# Patient Record
Sex: MALE | Race: WHITE | URBAN - METROPOLITAN AREA
[De-identification: names, ages, dates, MRNs, and addresses within clinical notes are randomized per-mention and may not be internally consistent; named-entity substitution may affect disease eponyms.]

---

## 2022-01-17 ENCOUNTER — WEB ENCOUNTER (OUTPATIENT)
Dept: URBAN - METROPOLITAN AREA CLINIC 13 | Facility: CLINIC | Age: 77
End: 2022-01-17

## 2022-01-18 ENCOUNTER — OFFICE VISIT (OUTPATIENT)
Dept: URBAN - METROPOLITAN AREA CLINIC 113 | Facility: CLINIC | Age: 77
End: 2022-01-18
Payer: COMMERCIAL

## 2022-01-18 ENCOUNTER — WEB ENCOUNTER (OUTPATIENT)
Dept: URBAN - METROPOLITAN AREA CLINIC 113 | Facility: CLINIC | Age: 77
End: 2022-01-18

## 2022-01-18 VITALS
DIASTOLIC BLOOD PRESSURE: 72 MMHG | BODY MASS INDEX: 27.11 KG/M2 | WEIGHT: 153 LBS | HEART RATE: 65 BPM | HEIGHT: 63 IN | SYSTOLIC BLOOD PRESSURE: 126 MMHG | TEMPERATURE: 97.5 F

## 2022-01-18 DIAGNOSIS — K76.6 PORTAL HYPERTENSION: ICD-10-CM

## 2022-01-18 DIAGNOSIS — K70.31 ASCITES DUE TO ALCOHOLIC CIRRHOSIS: ICD-10-CM

## 2022-01-18 DIAGNOSIS — R60.0 LOWER EXTREMITY EDEMA: ICD-10-CM

## 2022-01-18 DIAGNOSIS — D69.6 THROMBOCYTOPENIA: ICD-10-CM

## 2022-01-18 DIAGNOSIS — K74.60 CIRRHOSIS: ICD-10-CM

## 2022-01-18 DIAGNOSIS — K86.2 PANCREATIC CYST: ICD-10-CM

## 2022-01-18 DIAGNOSIS — I85.00 ESOPHAGEAL VARICES: ICD-10-CM

## 2022-01-18 PROCEDURE — 99205 OFFICE O/P NEW HI 60 MIN: CPT | Performed by: NURSE PRACTITIONER

## 2022-01-18 RX ORDER — CYCLOBENZAPRINE HYDROCHLORIDE 10 MG/1
1 TABLET AT BEDTIME AS NEEDED TABLET, FILM COATED ORAL ONCE A DAY
Status: ACTIVE | COMMUNITY

## 2022-01-18 RX ORDER — METOPROLOL TARTRATE 25 MG/1
1 TABLET WITH FOOD TABLET, FILM COATED ORAL TWICE A DAY
Status: ACTIVE | COMMUNITY

## 2022-01-18 RX ORDER — MULTIVITAMIN
1 TABLET TABLET ORAL ONCE A DAY
Status: ACTIVE | COMMUNITY

## 2022-01-18 RX ORDER — ZINC GLUCONATE 50 MG
1 TABLET TABLET ORAL ONCE A DAY
Status: ACTIVE | COMMUNITY

## 2022-01-18 RX ORDER — OMEPRAZOLE 20 MG/1
1 CAPSULE 30 MINUTES BEFORE MORNING MEAL CAPSULE, DELAYED RELEASE ORAL ONCE A DAY
Status: ACTIVE | COMMUNITY

## 2022-01-18 RX ORDER — LISINOPRIL 10 MG/1
1 TABLET TABLET ORAL ONCE A DAY
Status: ACTIVE | COMMUNITY

## 2022-01-18 RX ORDER — FUROSEMIDE 40 MG/1
1 TABLET TABLET ORAL ONCE A DAY
Qty: 30 TABLET | Refills: 2 | OUTPATIENT
Start: 2022-01-18

## 2022-01-18 RX ORDER — METFORMIN HYDROCHLORIDE 500 MG/1
1 TABLET WITH A MEAL TABLET, FILM COATED ORAL ONCE A DAY
Status: ACTIVE | COMMUNITY

## 2022-01-18 RX ORDER — SPIRONOLACTONE 100 MG/1
1 TABLET TABLET, FILM COATED ORAL ONCE A DAY
Qty: 30 TABLET | Refills: 2 | OUTPATIENT
Start: 2022-01-18 | End: 2022-04-18

## 2022-01-18 RX ORDER — TRAZODONE HYDROCHLORIDE 100 MG/1
1 TABLET AT BEDTIME TABLET, FILM COATED ORAL ONCE A DAY
Status: ACTIVE | COMMUNITY

## 2022-01-18 RX ORDER — ASCORBIC ACID 1000 MG
1 TABLET TABLET ORAL ONCE A DAY
Status: ACTIVE | COMMUNITY

## 2022-01-18 RX ORDER — SPIRONOLACTONE 50 MG/1
1 TABLET TABLET, FILM COATED ORAL ONCE A DAY
Status: ACTIVE | COMMUNITY

## 2022-01-18 RX ORDER — FUROSEMIDE 20 MG/1
1 TABLET TABLET ORAL ONCE A DAY
Status: ACTIVE | COMMUNITY

## 2022-01-18 RX ORDER — GLIMEPIRIDE 4 MG/1
1 TABLET WITH BREAKFAST OR THE FIRST MAIN MEAL OF THE DAY TABLET ORAL TWICE DAILY
Status: ACTIVE | COMMUNITY

## 2022-01-18 NOTE — HPI-TODAY'S VISIT:
76-year-old male with a history of hypertension, diabetes, hemochromatosis, presenting for evaluation of cirrhosis.  This is a second opinion.  He is accompanied by both his wife and daughter. He was diagnosed with cirrhosis several years ago, which was automatically attributed to his alcohol use.  Per the patient and his family, he never underwent further testing to exclude autoimmune, viral, or hereditary cause of liver disease.  He continued to drink alcohol despite this.  In October 2021, he experienced the sudden onset of abdominal pain, nausea, vomiting, diarrhea, and fluid retention, prompting follow-up with GI.  He was started on furosemide 20 mg daily and spironolactone 50 mg daily, and recommended routine office follow-up every 3 months.  This has been frustrating to the patient and family.  They do not feel that they have received a lot of answers and are confused regarding his diagnosis.  He continues to experience ongoing difficulty with swelling of his abdomen and legs, ultimately requiring paracentesis last week.  Recent labs and imaging findings are outlined below. He denies abdominal pain, nausea or vomiting.  He has 2 nonbloody stools per day.  No jaundice or icterus.  He complains of abdominal, scrotal, and leg swelling despite his current dosing of furosemide and spironolactone.  He reports that history of hemochromatosis, previously requiring phlebotomy.  This was managed by hematology, Dr. Warren.  He also reports a history of esophageal varices, previously requiring banding 2-3 times.  He cannot recall when his last upper endoscopy was performed.  He reports a normal colonoscopy 3 years ago.  There is no family history of GI malignancy. MRI of the abdomen with and without contrast/MRCP 1/14/2022:Cirrhosis with portal hypertension and large volume ascites, 4 mm sidebranch intraductal papillary mucinous neoplasm in the pancreatic tail. Ultrasound-guided paracentesis 1/10/2022:4.4 L removed. Labs 1/10/2022:H/H13.9/40.9, MCV 97.8, .  AST 49, ALT 29, , T bili 2.0, CMP otherwise unremarkable with BUN/creat 14/0.6.  PT/INR 15.9/1.3.  Current MELD score 13.

## 2022-01-25 ENCOUNTER — LAB OUTSIDE AN ENCOUNTER (OUTPATIENT)
Dept: URBAN - METROPOLITAN AREA CLINIC 113 | Facility: CLINIC | Age: 77
End: 2022-01-25

## 2022-02-03 ENCOUNTER — TELEPHONE ENCOUNTER (OUTPATIENT)
Dept: URBAN - METROPOLITAN AREA CLINIC 113 | Facility: CLINIC | Age: 77
End: 2022-02-03

## 2022-02-08 ENCOUNTER — TELEPHONE ENCOUNTER (OUTPATIENT)
Dept: URBAN - METROPOLITAN AREA CLINIC 6 | Facility: CLINIC | Age: 77
End: 2022-02-08

## 2022-02-10 ENCOUNTER — TELEPHONE ENCOUNTER (OUTPATIENT)
Dept: URBAN - METROPOLITAN AREA CLINIC 113 | Facility: CLINIC | Age: 77
End: 2022-02-10

## 2022-02-24 ENCOUNTER — OFFICE VISIT (OUTPATIENT)
Dept: URBAN - METROPOLITAN AREA CLINIC 113 | Facility: CLINIC | Age: 77
End: 2022-02-24
Payer: COMMERCIAL

## 2022-02-24 VITALS
TEMPERATURE: 97.7 F | HEART RATE: 63 BPM | SYSTOLIC BLOOD PRESSURE: 98 MMHG | BODY MASS INDEX: 21.97 KG/M2 | DIASTOLIC BLOOD PRESSURE: 64 MMHG | HEIGHT: 63 IN | WEIGHT: 124 LBS

## 2022-02-24 DIAGNOSIS — K76.6 PORTAL HYPERTENSION: ICD-10-CM

## 2022-02-24 DIAGNOSIS — K74.60 CIRRHOSIS: ICD-10-CM

## 2022-02-24 DIAGNOSIS — D69.6 THROMBOCYTOPENIA: ICD-10-CM

## 2022-02-24 DIAGNOSIS — K86.2 PANCREATIC CYST: ICD-10-CM

## 2022-02-24 DIAGNOSIS — I85.00 ESOPHAGEAL VARICES: ICD-10-CM

## 2022-02-24 DIAGNOSIS — E83.10 CIRRHOSIS DUE TO HEMOCHROMATOSIS: ICD-10-CM

## 2022-02-24 DIAGNOSIS — R18.8 OTHER ASCITES: ICD-10-CM

## 2022-02-24 PROBLEM — 1082601000119104: Status: ACTIVE | Noted: 2022-01-18

## 2022-02-24 PROCEDURE — 99214 OFFICE O/P EST MOD 30 MIN: CPT | Performed by: INTERNAL MEDICINE

## 2022-02-24 RX ORDER — ASCORBIC ACID 1000 MG
1 TABLET TABLET ORAL ONCE A DAY
Status: ACTIVE | COMMUNITY

## 2022-02-24 RX ORDER — METFORMIN HYDROCHLORIDE 500 MG/1
1 TABLET WITH A MEAL TABLET, FILM COATED ORAL ONCE A DAY
Status: ACTIVE | COMMUNITY

## 2022-02-24 RX ORDER — CYCLOBENZAPRINE HYDROCHLORIDE 10 MG/1
1 TABLET AT BEDTIME AS NEEDED TABLET, FILM COATED ORAL ONCE A DAY
Status: ACTIVE | COMMUNITY

## 2022-02-24 RX ORDER — SPIRONOLACTONE 100 MG/1
1 TABLET TABLET, FILM COATED ORAL ONCE A DAY
Qty: 30 TABLET | Refills: 2 | Status: ACTIVE | COMMUNITY
Start: 2022-01-18 | End: 2022-04-18

## 2022-02-24 RX ORDER — OMEPRAZOLE 20 MG/1
1 CAPSULE 30 MINUTES BEFORE MORNING MEAL CAPSULE, DELAYED RELEASE ORAL ONCE A DAY
Status: ACTIVE | COMMUNITY

## 2022-02-24 RX ORDER — LISINOPRIL 10 MG/1
1 TABLET TABLET ORAL ONCE A DAY
Status: ACTIVE | COMMUNITY

## 2022-02-24 RX ORDER — METOPROLOL TARTRATE 25 MG/1
1 TABLET WITH FOOD TABLET, FILM COATED ORAL TWICE A DAY
Status: ACTIVE | COMMUNITY

## 2022-02-24 RX ORDER — ZINC GLUCONATE 50 MG
1 TABLET TABLET ORAL ONCE A DAY
Status: ACTIVE | COMMUNITY

## 2022-02-24 RX ORDER — FUROSEMIDE 40 MG/1
1 TABLET TABLET ORAL ONCE A DAY
Qty: 30 TABLET | Refills: 2 | Status: ACTIVE | COMMUNITY
Start: 2022-01-18

## 2022-02-24 RX ORDER — MULTIVITAMIN
1 TABLET TABLET ORAL ONCE A DAY
Status: ACTIVE | COMMUNITY

## 2022-02-24 RX ORDER — GLIMEPIRIDE 4 MG/1
1 TABLET WITH BREAKFAST OR THE FIRST MAIN MEAL OF THE DAY TABLET ORAL TWICE DAILY
Status: ACTIVE | COMMUNITY

## 2022-02-24 RX ORDER — TRAZODONE HYDROCHLORIDE 100 MG/1
1 TABLET AT BEDTIME TABLET, FILM COATED ORAL ONCE A DAY
Status: ACTIVE | COMMUNITY

## 2022-02-24 NOTE — HPI-OTHER HISTORIES
On 1/26/2022 hepatitis a total antibody is positive, hepatitis B surface antigen negative, hepatitis B surface antibody negative, hepatitis B core antibody negative, hepatitis C antibody negative. Alpha-1 antitrypsin level is normal at 169.  Ferritin is 234.  Antinuclear antibody is negative, antimitochondrial antibody is negative, anti-smooth muscle antibody is negative. IgG level is elevated at 2092, IgA level is elevated at 533, IgM level is normal at 182.  Alpha-fetoprotein is 3.1.

## 2022-02-24 NOTE — HPI-TODAY'S VISIT:
76-year-old male with a history of hypertension, diabetes, hemochromatosis, presenting for evaluation of cirrhosis.  This is a second opinion.  He is accompanied by both his wife and daughter. He was diagnosed with cirrhosis several years ago, which was automatically attributed to his alcohol use.  Per the patient and his family, he never underwent further testing to exclude autoimmune, viral, or hereditary cause of liver disease.  He continued to drink alcohol despite this.  In October 2021, he experienced the sudden onset of abdominal pain, nausea, vomiting, diarrhea, and fluid retention, prompting follow-up with GI.  He was started on furosemide 20 mg daily and spironolactone 50 mg daily, and recommended routine office follow-up every 3 months.  This has been frustrating to the patient and family.  They do not feel that they have received a lot of answers and are confused regarding his diagnosis.  He continues to experience ongoing difficulty with swelling of his abdomen and legs, ultimately requiring paracentesis last week.  Recent labs and imaging findings are outlined below. He denies abdominal pain, nausea or vomiting.  He has 2 nonbloody stools per day.  No jaundice or icterus.  He complains of abdominal, scrotal, and leg swelling despite his current dosing of furosemide and spironolactone.  He reports that history of hemochromatosis, previously requiring phlebotomy.  This was managed by hematology, Dr. Warren.  There is no family history of GI malignancy. MRI of the abdomen with and without contrast/MRCP 1/14/2022:Cirrhosis with portal hypertension and large volume ascites, 4 mm sidebranch intraductal papillary mucinous neoplasm in the pancreatic tail. Ultrasound-guided paracentesis 1/10/2022:4.4 L removed. He is doing very well.  Denies any peripheral edema or abdominal distention.  He occasionally has heartburn but no dysphagia or abdominal pain.  There is no nausea or vomiting.  He has 2 bowel movements per day without any blood or melena. Labs 1/10/2022:H/H13.9/40.9, MCV 97.8, .  AST 49, ALT 29, , T bili 2.0, CMP otherwise unremarkable with BUN/creat 14/0.6.  PT/INR 15.9/1.3. Labs on 2/14/2022 revealed sodium 131 potassium 4.3 BUN 23 creatinine 0.7.  , ALT 93, alk phosphatase 216, total bili 1.3, albumin 3.3.

## 2022-02-28 ENCOUNTER — TELEPHONE ENCOUNTER (OUTPATIENT)
Dept: URBAN - METROPOLITAN AREA CLINIC 113 | Facility: CLINIC | Age: 77
End: 2022-02-28

## 2022-03-07 ENCOUNTER — LAB OUTSIDE AN ENCOUNTER (OUTPATIENT)
Dept: URBAN - METROPOLITAN AREA CLINIC 113 | Facility: CLINIC | Age: 77
End: 2022-03-07

## 2022-03-16 ENCOUNTER — TELEPHONE ENCOUNTER (OUTPATIENT)
Dept: URBAN - METROPOLITAN AREA CLINIC 113 | Facility: CLINIC | Age: 77
End: 2022-03-16

## 2022-03-21 ENCOUNTER — LAB OUTSIDE AN ENCOUNTER (OUTPATIENT)
Dept: URBAN - METROPOLITAN AREA CLINIC 113 | Facility: CLINIC | Age: 77
End: 2022-03-21

## 2022-03-24 ENCOUNTER — TELEPHONE ENCOUNTER (OUTPATIENT)
Dept: URBAN - METROPOLITAN AREA CLINIC 113 | Facility: CLINIC | Age: 77
End: 2022-03-24

## 2022-04-06 ENCOUNTER — TELEPHONE ENCOUNTER (OUTPATIENT)
Dept: URBAN - METROPOLITAN AREA CLINIC 113 | Facility: CLINIC | Age: 77
End: 2022-04-06

## 2022-04-13 ENCOUNTER — OFFICE VISIT (OUTPATIENT)
Dept: URBAN - METROPOLITAN AREA CLINIC 113 | Facility: CLINIC | Age: 77
End: 2022-04-13
Payer: COMMERCIAL

## 2022-04-13 VITALS
DIASTOLIC BLOOD PRESSURE: 64 MMHG | TEMPERATURE: 98.2 F | HEIGHT: 63 IN | SYSTOLIC BLOOD PRESSURE: 106 MMHG | BODY MASS INDEX: 22.86 KG/M2 | HEART RATE: 64 BPM | WEIGHT: 129 LBS

## 2022-04-13 DIAGNOSIS — E83.10 CIRRHOSIS DUE TO HEMOCHROMATOSIS: ICD-10-CM

## 2022-04-13 DIAGNOSIS — I85.00 ESOPHAGEAL VARICES: ICD-10-CM

## 2022-04-13 DIAGNOSIS — K74.60 CIRRHOSIS: ICD-10-CM

## 2022-04-13 DIAGNOSIS — R18.8 OTHER ASCITES: ICD-10-CM

## 2022-04-13 DIAGNOSIS — R05.9 COUGH: ICD-10-CM

## 2022-04-13 DIAGNOSIS — K86.2 PANCREATIC CYST: ICD-10-CM

## 2022-04-13 DIAGNOSIS — D69.6 THROMBOCYTOPENIA: ICD-10-CM

## 2022-04-13 DIAGNOSIS — K76.6 PORTAL HYPERTENSION: ICD-10-CM

## 2022-04-13 DIAGNOSIS — K40.90 RIGHT INGUINAL HERNIA: ICD-10-CM

## 2022-04-13 PROCEDURE — 99214 OFFICE O/P EST MOD 30 MIN: CPT | Performed by: INTERNAL MEDICINE

## 2022-04-13 RX ORDER — ZINC GLUCONATE 50 MG
1 TABLET TABLET ORAL ONCE A DAY
Status: ACTIVE | COMMUNITY

## 2022-04-13 RX ORDER — GLIMEPIRIDE 4 MG/1
1 TABLET WITH BREAKFAST OR THE FIRST MAIN MEAL OF THE DAY TABLET ORAL TWICE DAILY
Status: ACTIVE | COMMUNITY

## 2022-04-13 RX ORDER — FUROSEMIDE 40 MG/1
1 TABLET TABLET ORAL ONCE A DAY
Qty: 30 TABLET | Refills: 2 | Status: ACTIVE | COMMUNITY
Start: 2022-01-18

## 2022-04-13 RX ORDER — MULTIVITAMIN
1 TABLET TABLET ORAL ONCE A DAY
Status: ACTIVE | COMMUNITY

## 2022-04-13 RX ORDER — INSULIN LISPRO 100 [IU]/ML
AS DIRECTED INJECTION, SOLUTION INTRAVENOUS; SUBCUTANEOUS
Status: ACTIVE | COMMUNITY

## 2022-04-13 RX ORDER — INSULIN DEGLUDEC INJECTION 100 U/ML
AS DIRECTED INJECTION, SOLUTION SUBCUTANEOUS
Status: ACTIVE | COMMUNITY

## 2022-04-13 RX ORDER — SPIRONOLACTONE 100 MG/1
1 TABLET TABLET, FILM COATED ORAL ONCE A DAY
Qty: 30 TABLET | Refills: 2 | Status: ACTIVE | COMMUNITY
Start: 2022-01-18 | End: 2022-04-18

## 2022-04-13 RX ORDER — ASCORBIC ACID 1000 MG
1 TABLET TABLET ORAL ONCE A DAY
Status: ACTIVE | COMMUNITY

## 2022-04-13 RX ORDER — TRAZODONE HYDROCHLORIDE 100 MG/1
1 TABLET AT BEDTIME TABLET, FILM COATED ORAL ONCE A DAY
Status: ACTIVE | COMMUNITY

## 2022-04-13 RX ORDER — LISINOPRIL 10 MG/1
1 TABLET TABLET ORAL ONCE A DAY
Status: ON HOLD | COMMUNITY

## 2022-04-13 RX ORDER — OMEPRAZOLE 20 MG/1
1 CAPSULE 30 MINUTES BEFORE MORNING MEAL CAPSULE, DELAYED RELEASE ORAL ONCE A DAY
Status: ACTIVE | COMMUNITY

## 2022-04-13 RX ORDER — CYCLOBENZAPRINE HYDROCHLORIDE 10 MG/1
1 TABLET AT BEDTIME AS NEEDED TABLET, FILM COATED ORAL ONCE A DAY
Status: ACTIVE | COMMUNITY

## 2022-04-13 RX ORDER — METFORMIN HYDROCHLORIDE 500 MG/1
1 TABLET WITH A MEAL TABLET, FILM COATED ORAL ONCE A DAY
Status: ON HOLD | COMMUNITY

## 2022-04-13 RX ORDER — METOPROLOL TARTRATE 25 MG/1
1 TABLET WITH FOOD TABLET, FILM COATED ORAL TWICE A DAY
Status: ACTIVE | COMMUNITY

## 2022-04-13 NOTE — PHYSICAL EXAM GASTROINTESTINAL
Abdomen , soft, nontender, Distended but soft ascites is present., no guarding or rigidity , no masses palpable , normal bowel sounds , Liver and Spleen , no hepatomegaly present , no hepatosplenomegaly , liver nontender , spleen not palpable

## 2022-04-13 NOTE — HPI-TODAY'S VISIT:
76-year-old male with a history of hypertension, diabetes, hemochromatosis, presenting for evaluation of cirrhosis.  This is a second opinion.  He is accompanied by both his wife and daughter. He was diagnosed with cirrhosis several years ago, which was automatically attributed to his alcohol use.  Per the patient and his family, he never underwent further testing to exclude autoimmune, viral, or hereditary cause of liver disease.  He continued to drink alcohol despite this.  In October 2021, he experienced the sudden onset of abdominal pain, nausea, vomiting, diarrhea, and fluid retention, prompting follow-up with GI.  He was started on furosemide 20 mg daily and spironolactone 50 mg daily, and recommended routine office follow-up every 3 months.  This has been frustrating to the patient and family.  They do not feel that they have received a lot of answers and are confused regarding his diagnosis.  He continues to experience ongoing difficulty with swelling of his abdomen and legs, ultimately requiring paracentesis last week.  Recent labs and imaging findings are outlined below. He denies abdominal pain, nausea or vomiting.  He has 2 nonbloody stools per day.  No jaundice or icterus.  He complains of abdominal, scrotal, and leg swelling despite his current dosing of furosemide and spironolactone.  He reports that history of hemochromatosis, previously requiring phlebotomy.  This was managed by hematology, Dr. Warren.  There is no family history of GI malignancy. MRI of the abdomen with and without contrast/MRCP 1/14/2022:Cirrhosis with portal hypertension and large volume ascites, 4 mm sidebranch intraductal papillary mucinous neoplasm in the pancreatic tail. Ultrasound-guided paracentesis 1/10/2022:4.4 L removed.  Labs 1/10/2022:H/H13.9/40.9, MCV 97.8, .  AST 49, ALT 29, , T bili 2.0, CMP otherwise unremarkable with BUN/creat 14/0.6.  PT/INR 15.9/1.3. Labs on 2/14/2022 revealed sodium 131 potassium 4.3 BUN 23 creatinine 0.7.  , ALT 93, alk phosphatase 216, total bili 1.3, albumin 3.3. Patient had been having some coughing.  This was clear material.  He saw his PCP and they did a chest x-ray but we do not have that result.  Lisinopril was discontinued.  The cough is better currently.  He denies heartburn as long as he takes his proton pump inhibitor.  Both solids and liquids are little slow to go down but no obstructive symptoms.  He has noticed a lump in his right groin area.  Occasionally can be tender.  He has bowel movements about twice a day and has been no blood or melena.  There's been no nausea or vomiting.  There is no fevers or chills.  Abdominal distention has remained stable.  He brings broad work with him that was done on 4/4/22.  This revealed a sodium 128 potassium 4.6 BUN 29 creatinine 0.7 because of 280.  Albumin is 2.8.  Total bili is 1.5, AST 97, ALT 76, alkaline phosphatase is 2:30.  Hemoglobin is 13.5, WBC is 7.7 and platelet count is 165,000.  PT/INR is 1.4.

## 2022-04-25 ENCOUNTER — TELEPHONE ENCOUNTER (OUTPATIENT)
Dept: URBAN - METROPOLITAN AREA CLINIC 107 | Facility: CLINIC | Age: 77
End: 2022-04-25

## 2022-04-25 RX ORDER — SPIRONOLACTONE 100 MG/1
1 TABLET TABLET, FILM COATED ORAL ONCE A DAY
Qty: 30 | Refills: 3 | OUTPATIENT
Start: 2022-04-25 | End: 2022-08-23

## 2022-05-02 ENCOUNTER — LAB OUTSIDE AN ENCOUNTER (OUTPATIENT)
Dept: URBAN - METROPOLITAN AREA CLINIC 113 | Facility: CLINIC | Age: 77
End: 2022-05-02

## 2022-05-04 ENCOUNTER — OFFICE VISIT (OUTPATIENT)
Dept: URBAN - METROPOLITAN AREA CLINIC 113 | Facility: CLINIC | Age: 77
End: 2022-05-04

## 2022-06-08 ENCOUNTER — TELEPHONE ENCOUNTER (OUTPATIENT)
Dept: URBAN - METROPOLITAN AREA CLINIC 23 | Facility: CLINIC | Age: 77
End: 2022-06-08

## 2022-06-08 RX ORDER — FUROSEMIDE 40 MG/1
1 TABLET TABLET ORAL ONCE A DAY
Qty: 30 TABLET | Refills: 6
Start: 2022-01-18

## 2022-06-10 ENCOUNTER — OFFICE VISIT (OUTPATIENT)
Dept: URBAN - METROPOLITAN AREA CLINIC 113 | Facility: CLINIC | Age: 77
End: 2022-06-10

## 2022-06-14 ENCOUNTER — TELEPHONE ENCOUNTER (OUTPATIENT)
Dept: URBAN - METROPOLITAN AREA CLINIC 113 | Facility: CLINIC | Age: 77
End: 2022-06-14

## 2022-06-14 RX ORDER — FUROSEMIDE 40 MG/1
1 TABLET TABLET ORAL ONCE A DAY
Qty: 30 TABLET | Refills: 6
Start: 2022-01-18

## 2022-07-06 ENCOUNTER — OFFICE VISIT (OUTPATIENT)
Dept: URBAN - METROPOLITAN AREA CLINIC 113 | Facility: CLINIC | Age: 77
End: 2022-07-06

## 2022-09-02 ENCOUNTER — TELEPHONE ENCOUNTER (OUTPATIENT)
Dept: URBAN - METROPOLITAN AREA CLINIC 113 | Facility: CLINIC | Age: 77
End: 2022-09-02

## 2022-09-02 RX ORDER — SPIRONOLACTONE 100 MG/1
1 TABLET TABLET, FILM COATED ORAL ONCE A DAY
Qty: 90 | Refills: 0
Start: 2022-04-25 | End: 2022-12-01

## 2022-09-05 ENCOUNTER — ERX REFILL RESPONSE (OUTPATIENT)
Dept: URBAN - METROPOLITAN AREA CLINIC 107 | Facility: CLINIC | Age: 77
End: 2022-09-05

## 2022-09-05 RX ORDER — SPIRONOLACTONE 100 MG/1
1 TABLET TABLET, FILM COATED ORAL ONCE A DAY
Qty: 90 | Refills: 0 | OUTPATIENT

## 2022-10-11 ENCOUNTER — OFFICE VISIT (OUTPATIENT)
Dept: URBAN - METROPOLITAN AREA CLINIC 113 | Facility: CLINIC | Age: 77
End: 2022-10-11

## 2022-11-15 ENCOUNTER — OFFICE VISIT (OUTPATIENT)
Dept: URBAN - METROPOLITAN AREA CLINIC 113 | Facility: CLINIC | Age: 77
End: 2022-11-15
Payer: COMMERCIAL

## 2022-11-15 VITALS
HEART RATE: 59 BPM | RESPIRATION RATE: 16 BRPM | BODY MASS INDEX: 27.46 KG/M2 | DIASTOLIC BLOOD PRESSURE: 60 MMHG | WEIGHT: 155 LBS | SYSTOLIC BLOOD PRESSURE: 113 MMHG | TEMPERATURE: 97.8 F | HEIGHT: 63 IN

## 2022-11-15 DIAGNOSIS — K76.6 PORTAL HYPERTENSION: ICD-10-CM

## 2022-11-15 DIAGNOSIS — K86.2 PANCREATIC CYST: ICD-10-CM

## 2022-11-15 DIAGNOSIS — D69.6 THROMBOCYTOPENIA: ICD-10-CM

## 2022-11-15 DIAGNOSIS — R05.9 COUGH: ICD-10-CM

## 2022-11-15 DIAGNOSIS — K40.90 RIGHT INGUINAL HERNIA: ICD-10-CM

## 2022-11-15 DIAGNOSIS — R18.8 OTHER ASCITES: ICD-10-CM

## 2022-11-15 DIAGNOSIS — I85.00 ESOPHAGEAL VARICES: ICD-10-CM

## 2022-11-15 DIAGNOSIS — E83.10 CIRRHOSIS DUE TO HEMOCHROMATOSIS: ICD-10-CM

## 2022-11-15 DIAGNOSIS — K74.60 CIRRHOSIS: ICD-10-CM

## 2022-11-15 PROBLEM — 28670008 ESOPHAGEAL VARICES: Status: ACTIVE | Noted: 2022-01-18

## 2022-11-15 PROBLEM — 31258000 PANCREATIC CYST: Status: ACTIVE | Noted: 2022-02-24

## 2022-11-15 PROBLEM — 302215000 THROMBOCYTOPENIA: Status: ACTIVE | Noted: 2022-01-18

## 2022-11-15 PROBLEM — 34742003 PORTAL HYPERTENSION: Status: ACTIVE | Noted: 2022-01-18

## 2022-11-15 PROBLEM — 389026000: Status: ACTIVE | Noted: 2022-02-24

## 2022-11-15 PROBLEM — 236021006: Status: ACTIVE | Noted: 2022-04-13

## 2022-11-15 PROBLEM — 19943007: Status: ACTIVE | Noted: 2022-02-24

## 2022-11-15 PROBLEM — 49727002: Status: ACTIVE | Noted: 2022-04-13

## 2022-11-15 PROCEDURE — 99214 OFFICE O/P EST MOD 30 MIN: CPT | Performed by: INTERNAL MEDICINE

## 2022-11-15 RX ORDER — METFORMIN HYDROCHLORIDE 500 MG/1
1 TABLET WITH A MEAL TABLET, FILM COATED ORAL ONCE A DAY
Status: ON HOLD | COMMUNITY

## 2022-11-15 RX ORDER — SPIRONOLACTONE 100 MG/1
1 TABLET TABLET, FILM COATED ORAL ONCE A DAY
Qty: 90 | Refills: 0 | Status: ACTIVE | COMMUNITY

## 2022-11-15 RX ORDER — LISINOPRIL 10 MG/1
1 TABLET TABLET ORAL ONCE A DAY
Status: ON HOLD | COMMUNITY

## 2022-11-15 RX ORDER — TRAZODONE HYDROCHLORIDE 100 MG/1
1 TABLET AT BEDTIME TABLET, FILM COATED ORAL ONCE A DAY
Status: ACTIVE | COMMUNITY

## 2022-11-15 RX ORDER — INSULIN LISPRO 100 [IU]/ML
AS DIRECTED INJECTION, SOLUTION INTRAVENOUS; SUBCUTANEOUS
Status: ACTIVE | COMMUNITY

## 2022-11-15 RX ORDER — OMEPRAZOLE 20 MG/1
1 CAPSULE 30 MINUTES BEFORE MORNING MEAL CAPSULE, DELAYED RELEASE ORAL ONCE A DAY
Status: ACTIVE | COMMUNITY

## 2022-11-15 RX ORDER — ZINC GLUCONATE 50 MG
1 TABLET TABLET ORAL ONCE A DAY
Status: ACTIVE | COMMUNITY

## 2022-11-15 RX ORDER — INSULIN DEGLUDEC INJECTION 100 U/ML
AS DIRECTED INJECTION, SOLUTION SUBCUTANEOUS
Status: ACTIVE | COMMUNITY

## 2022-11-15 RX ORDER — MULTIVITAMIN
1 TABLET TABLET ORAL ONCE A DAY
Status: ACTIVE | COMMUNITY

## 2022-11-15 RX ORDER — FUROSEMIDE 40 MG/1
1 TABLET TABLET ORAL ONCE A DAY
Qty: 30 TABLET | Refills: 6 | Status: ACTIVE | COMMUNITY
Start: 2022-01-18

## 2022-11-15 RX ORDER — ASCORBIC ACID 1000 MG
1 TABLET TABLET ORAL ONCE A DAY
Status: ACTIVE | COMMUNITY

## 2022-11-15 RX ORDER — CYCLOBENZAPRINE HYDROCHLORIDE 10 MG/1
1 TABLET AT BEDTIME AS NEEDED TABLET, FILM COATED ORAL ONCE A DAY
Status: ACTIVE | COMMUNITY

## 2022-11-15 RX ORDER — GLIMEPIRIDE 4 MG/1
1 TABLET WITH BREAKFAST OR THE FIRST MAIN MEAL OF THE DAY TABLET ORAL TWICE DAILY
Status: ACTIVE | COMMUNITY

## 2022-11-15 RX ORDER — METOPROLOL TARTRATE 25 MG/1
1 TABLET WITH FOOD TABLET, FILM COATED ORAL TWICE A DAY
Status: ACTIVE | COMMUNITY

## 2022-11-15 NOTE — HPI-TODAY'S VISIT:
76-year-old male with a history of hypertension, diabetes, hemochromatosis, presenting for evaluation of cirrhosis.  This is a second opinion.  He is accompanied by both his wife and daughter.  He was diagnosed with cirrhosis several years ago, which was automatically attributed to his alcohol use.  Per the patient and his family, he never underwent further testing to exclude autoimmune, viral, or hereditary cause of liver disease.  He continued to drink alcohol despite this.  He denies abdominal pain, nausea or vomiting.  He has 2 nonbloody stools per day.  No jaundice or icterus.  He complains of abdominal, scrotal, and leg swelling despite his current dosing of furosemide and spironolactone.  He reports that history of hemochromatosis, previously requiring phlebotomy.  This was managed by hematology, Dr. Warren.  There is no family history of GI malignancy.  Ultrasound-guided paracentesis 1/10/2022:4.4 L removed.  Labs 1/10/2022:H/H13.9/40.9, MCV 97.8, .  AST 49, ALT 29, , T bili 2.0, CMP otherwise unremarkable with BUN/creat 14/0.6.  PT/INR 15.9/1.3. Labs on 2/14/2022 revealed sodium 131 potassium 4.3 BUN 23 creatinine 0.7.  , ALT 93, alk phosphatase 216, total bili 1.3, albumin 3.3.  PT/INR is 1.4 on 4/4/22  Patient did have a PET scan that revealed extensive right lower lung changes with air bronchograms and cavitation.  Bronchoscopy revealed cryptococcus.  He is currently being treated.  Overall he is feeling much better.  This been no increased peripheral edema, no nausea or vomiting, heartburn or dysphagia.  He denies any abdominal pain.  He has 2-3 bowel as per day there's been no blood or melena. Blood work on 9/16/2022 revealed a hemoglobin of 15.2, WBC of 5 and platelet count of 73,000.  Sodium is 135 potassium 4.1 BUN 16 creatinine 1.0.  Glucose is 324.  AST is 90, ALT is 76, alk phosphatase 161 and total bili 0.9. The bringing additional blood work which showed BUN 15 creatinine 1 glucose 267.  Sodium 134 potassium 4.4.  Total bili is 1.4, AST 79, ALT 69, alkaline phosphatase is 151.  Hemoglobin 14.6 count 67,000 WBC is 5.4.  PT/INR on 10/6S 22 was 1.2.

## 2022-11-15 NOTE — HPI-OTHER HISTORIES
MRI of the abdomen with and without contrast/MRCP 1/2022 revealed liver cirrhosis with portal hypertension and large volume ascites.  There is a 4 mm sidebranch intraductal papillary mucinous neoplasm in the tail of the pancreas.  On 1/26/2022 hepatitis a total antibody is positive, hepatitis B surface antigen negative, hepatitis B surface antibody negative, hepatitis B core antibody negative, hepatitis C antibody negative. Alpha-1 antitrypsin level is normal at 169.  Ferritin is 234.  Antinuclear antibody is negative, antimitochondrial antibody is negative, anti-smooth muscle antibody is negative. IgG level is elevated at 2092, IgA level is elevated at 533, IgM level is normal at 182.  Alpha-fetoprotein is 3.1.

## 2022-11-16 ENCOUNTER — ERX REFILL RESPONSE (OUTPATIENT)
Dept: URBAN - METROPOLITAN AREA CLINIC 107 | Facility: CLINIC | Age: 77
End: 2022-11-16

## 2022-11-16 RX ORDER — SPIRONOLACTONE 100 MG/1
TAKE 1 TABLET BY MOUTH ONCE DAILY TABLET, FILM COATED ORAL
Qty: 90 TABLET | Refills: 0 | OUTPATIENT

## 2022-11-16 RX ORDER — SPIRONOLACTONE 100 MG/1
1 TABLET TABLET, FILM COATED ORAL ONCE A DAY
Qty: 90 | Refills: 0 | OUTPATIENT

## 2022-12-02 ENCOUNTER — TELEPHONE ENCOUNTER (OUTPATIENT)
Dept: URBAN - METROPOLITAN AREA CLINIC 113 | Facility: CLINIC | Age: 77
End: 2022-12-02

## 2022-12-13 ENCOUNTER — TELEPHONE ENCOUNTER (OUTPATIENT)
Dept: URBAN - METROPOLITAN AREA CLINIC 113 | Facility: CLINIC | Age: 77
End: 2022-12-13

## 2022-12-27 ENCOUNTER — TELEPHONE ENCOUNTER (OUTPATIENT)
Dept: URBAN - METROPOLITAN AREA SURGERY CENTER 30 | Facility: SURGERY CENTER | Age: 77
End: 2022-12-27

## 2023-01-09 ENCOUNTER — LAB OUTSIDE AN ENCOUNTER (OUTPATIENT)
Dept: URBAN - METROPOLITAN AREA CLINIC 113 | Facility: CLINIC | Age: 78
End: 2023-01-09

## 2023-01-20 ENCOUNTER — TELEPHONE ENCOUNTER (OUTPATIENT)
Dept: URBAN - METROPOLITAN AREA CLINIC 113 | Facility: CLINIC | Age: 78
End: 2023-01-20

## 2023-01-20 PROBLEM — 255417007 CIRRHOTIC: Status: ACTIVE | Noted: 2022-01-18

## 2023-01-20 RX ORDER — AMILORIDE HYDROCLORIDE 5 MG/1
1 TABLET WITH FOOD TABLET ORAL ONCE A DAY
Qty: 30 | Refills: 1 | OUTPATIENT
Start: 2023-01-20

## 2023-01-20 RX ORDER — SPIRONOLACTONE 100 MG/1
TAKE 1 TABLET BY MOUTH ONCE DAILY TABLET, FILM COATED ORAL
Qty: 90 TABLET | Refills: 0 | COMMUNITY

## 2023-01-20 RX ORDER — ASCORBIC ACID 1000 MG
1 TABLET TABLET ORAL ONCE A DAY
COMMUNITY

## 2023-01-20 RX ORDER — INSULIN LISPRO 100 [IU]/ML
AS DIRECTED INJECTION, SOLUTION INTRAVENOUS; SUBCUTANEOUS
COMMUNITY

## 2023-01-20 RX ORDER — INSULIN DEGLUDEC INJECTION 100 U/ML
AS DIRECTED INJECTION, SOLUTION SUBCUTANEOUS
COMMUNITY

## 2023-01-20 RX ORDER — TRAZODONE HYDROCHLORIDE 100 MG/1
1 TABLET AT BEDTIME TABLET, FILM COATED ORAL ONCE A DAY
COMMUNITY

## 2023-01-20 RX ORDER — CYCLOBENZAPRINE HYDROCHLORIDE 10 MG/1
1 TABLET AT BEDTIME AS NEEDED TABLET, FILM COATED ORAL ONCE A DAY
COMMUNITY

## 2023-01-20 RX ORDER — FUROSEMIDE 40 MG/1
1 TABLET TABLET ORAL ONCE A DAY
Qty: 30 TABLET | Refills: 6 | COMMUNITY
Start: 2022-01-18

## 2023-01-20 RX ORDER — ZINC GLUCONATE 50 MG
1 TABLET TABLET ORAL ONCE A DAY
COMMUNITY

## 2023-01-20 RX ORDER — GLIMEPIRIDE 4 MG/1
1 TABLET WITH BREAKFAST OR THE FIRST MAIN MEAL OF THE DAY TABLET ORAL TWICE DAILY
COMMUNITY

## 2023-01-20 RX ORDER — OMEPRAZOLE 20 MG/1
1 CAPSULE 30 MINUTES BEFORE MORNING MEAL CAPSULE, DELAYED RELEASE ORAL ONCE A DAY
COMMUNITY

## 2023-01-20 RX ORDER — METFORMIN HYDROCHLORIDE 500 MG/1
1 TABLET WITH A MEAL TABLET, FILM COATED ORAL ONCE A DAY
COMMUNITY

## 2023-01-20 RX ORDER — MULTIVITAMIN
1 TABLET TABLET ORAL ONCE A DAY
COMMUNITY

## 2023-01-20 RX ORDER — LISINOPRIL 10 MG/1
1 TABLET TABLET ORAL ONCE A DAY
COMMUNITY

## 2023-01-20 RX ORDER — METOPROLOL TARTRATE 25 MG/1
1 TABLET WITH FOOD TABLET, FILM COATED ORAL TWICE A DAY
COMMUNITY

## 2023-02-03 ENCOUNTER — LAB OUTSIDE AN ENCOUNTER (OUTPATIENT)
Dept: URBAN - METROPOLITAN AREA CLINIC 113 | Facility: CLINIC | Age: 78
End: 2023-02-03

## 2023-02-21 ENCOUNTER — ERX REFILL RESPONSE (OUTPATIENT)
Dept: URBAN - METROPOLITAN AREA CLINIC 113 | Facility: CLINIC | Age: 78
End: 2023-02-21

## 2023-02-21 RX ORDER — AMILORIDE HYDROCLORIDE 5 MG/1
TAKE 1 TABLET BY MOUTH EVERY DAY WITH FOOD THANK YOU TABLET ORAL
Qty: 30 TABLET | Refills: 1 | OUTPATIENT

## 2023-02-21 RX ORDER — AMILORIDE HYDROCLORIDE 5 MG/1
1 TABLET WITH FOOD TABLET ORAL ONCE A DAY
Qty: 30 | Refills: 1 | OUTPATIENT

## 2023-03-24 ENCOUNTER — WEB ENCOUNTER (OUTPATIENT)
Dept: URBAN - METROPOLITAN AREA CLINIC 113 | Facility: CLINIC | Age: 78
End: 2023-03-24

## 2023-03-24 ENCOUNTER — OFFICE VISIT (OUTPATIENT)
Dept: URBAN - METROPOLITAN AREA CLINIC 113 | Facility: CLINIC | Age: 78
End: 2023-03-24
Payer: COMMERCIAL

## 2023-03-24 VITALS
HEIGHT: 63 IN | BODY MASS INDEX: 28.17 KG/M2 | SYSTOLIC BLOOD PRESSURE: 129 MMHG | RESPIRATION RATE: 14 BRPM | DIASTOLIC BLOOD PRESSURE: 63 MMHG | TEMPERATURE: 97.3 F | WEIGHT: 159 LBS | HEART RATE: 57 BPM

## 2023-03-24 DIAGNOSIS — K74.60 CIRRHOSIS: ICD-10-CM

## 2023-03-24 DIAGNOSIS — R05.9 COUGH: ICD-10-CM

## 2023-03-24 DIAGNOSIS — I85.00 ESOPHAGEAL VARICES: ICD-10-CM

## 2023-03-24 DIAGNOSIS — D69.6 THROMBOCYTOPENIA: ICD-10-CM

## 2023-03-24 DIAGNOSIS — K40.90 RIGHT INGUINAL HERNIA: ICD-10-CM

## 2023-03-24 DIAGNOSIS — R18.8 OTHER ASCITES: ICD-10-CM

## 2023-03-24 DIAGNOSIS — K86.2 PANCREATIC CYST: ICD-10-CM

## 2023-03-24 DIAGNOSIS — K76.6 PORTAL HYPERTENSION: ICD-10-CM

## 2023-03-24 DIAGNOSIS — E83.10 CIRRHOSIS DUE TO HEMOCHROMATOSIS: ICD-10-CM

## 2023-03-24 PROCEDURE — 99214 OFFICE O/P EST MOD 30 MIN: CPT | Performed by: INTERNAL MEDICINE

## 2023-03-24 RX ORDER — GLIMEPIRIDE 4 MG/1
1 TABLET WITH BREAKFAST OR THE FIRST MAIN MEAL OF THE DAY TABLET ORAL TWICE DAILY
Status: ACTIVE | COMMUNITY

## 2023-03-24 RX ORDER — CYCLOBENZAPRINE HYDROCHLORIDE 10 MG/1
1 TABLET AT BEDTIME AS NEEDED TABLET, FILM COATED ORAL ONCE A DAY
Status: ON HOLD | COMMUNITY

## 2023-03-24 RX ORDER — LISINOPRIL 10 MG/1
1 TABLET TABLET ORAL ONCE A DAY
Status: ON HOLD | COMMUNITY

## 2023-03-24 RX ORDER — OMEPRAZOLE 20 MG/1
1 CAPSULE 30 MINUTES BEFORE MORNING MEAL CAPSULE, DELAYED RELEASE ORAL ONCE A DAY
Status: ACTIVE | COMMUNITY

## 2023-03-24 RX ORDER — METOPROLOL TARTRATE 25 MG/1
1 TABLET WITH FOOD TABLET, FILM COATED ORAL TWICE A DAY
Status: ACTIVE | COMMUNITY

## 2023-03-24 RX ORDER — INSULIN LISPRO 100 [IU]/ML
AS DIRECTED INJECTION, SOLUTION INTRAVENOUS; SUBCUTANEOUS
Status: ACTIVE | COMMUNITY

## 2023-03-24 RX ORDER — ASCORBIC ACID 1000 MG
1 TABLET TABLET ORAL ONCE A DAY
Status: ACTIVE | COMMUNITY

## 2023-03-24 RX ORDER — METFORMIN HYDROCHLORIDE 500 MG/1
1 TABLET WITH A MEAL TABLET, FILM COATED ORAL ONCE A DAY
Status: ON HOLD | COMMUNITY

## 2023-03-24 RX ORDER — TRAZODONE HYDROCHLORIDE 100 MG/1
1 TABLET AT BEDTIME TABLET, FILM COATED ORAL ONCE A DAY
Status: ACTIVE | COMMUNITY

## 2023-03-24 RX ORDER — MULTIVITAMIN
1 TABLET TABLET ORAL ONCE A DAY
COMMUNITY

## 2023-03-24 RX ORDER — INSULIN DEGLUDEC INJECTION 100 U/ML
AS DIRECTED INJECTION, SOLUTION SUBCUTANEOUS
Status: ACTIVE | COMMUNITY

## 2023-03-24 RX ORDER — AMILORIDE HYDROCLORIDE 5 MG/1
TAKE 1 TABLET BY MOUTH EVERY DAY WITH FOOD THANK YOU TABLET ORAL
Qty: 30 TABLET | Refills: 1 | Status: ACTIVE | COMMUNITY

## 2023-03-24 RX ORDER — FUROSEMIDE 40 MG/1
1 TABLET TABLET ORAL ONCE A DAY
Qty: 30 TABLET | Refills: 6 | Status: ACTIVE | COMMUNITY
Start: 2022-01-18

## 2023-03-24 RX ORDER — SPIRONOLACTONE 100 MG/1
TAKE 1 TABLET BY MOUTH ONCE DAILY TABLET, FILM COATED ORAL
Qty: 90 TABLET | Refills: 0 | Status: ON HOLD | COMMUNITY

## 2023-03-24 RX ORDER — ZINC GLUCONATE 50 MG
1 TABLET TABLET ORAL ONCE A DAY
Status: ON HOLD | COMMUNITY

## 2023-03-24 NOTE — HPI-TODAY'S VISIT:
77-year-old male with a history of hypertension, diabetes, hemochromatosis, presenting for evaluation of cirrhosis.  This is a second opinion.  He is accompanied by both his wife and daughter.  He was diagnosed with cirrhosis several years ago, which was automatically attributed to his alcohol use.  Per the patient and his family, he never underwent further testing to exclude autoimmune, viral, or hereditary cause of liver disease.  He continued to drink alcohol despite this.  He denies abdominal pain, nausea or vomiting.  He has 2 nonbloody stools per day.  No jaundice or icterus.  He complains of abdominal, scrotal, and leg swelling despite his current dosing of furosemide and spironolactone.  He reports that history of hemochromatosis, previously requiring phlebotomy.  This was managed by hematology, Dr. Warren.  There is no family history of GI malignancy.  Ultrasound-guided paracentesis 1/10/2022:4.4 L removed.  Labs 1/10/2022:H/H13.9/40.9, MCV 97.8, .  AST 49, ALT 29, , T bili 2.0, CMP otherwise unremarkable with BUN/creat 14/0.6.  PT/INR 15.9/1.3. Labs on 2/14/2022 revealed sodium 131 potassium 4.3 BUN 23 creatinine 0.7.  , ALT 93, alk phosphatase 216, total bili 1.3, albumin 3.3.  PT/INR is 1.4 on 4/4/22. Blood work on 1/10/2023 revealed a hemoglobin 14.9, WBC of 4.6 and platelet count of 69,000.  Sodium 134 potassium 4 BUN is 20 creatinine 0.8.  Total bili 1.0.  AST 80 ALT 52, alk phos of 146.  PT/INR is 1.3.  On 11/28/2022 alpha-fetoprotein is 4. Doing well.  He does have some dyspnea on exertion but his cough is much improved.  He denies any chest pain.  He has no dysphagia, heartburn, abdominal pain.  He occasionally has nausea at night.  There's been no vomiting.  He is to [place] today there's been no blood or melena. They bring blood work from 3/6/23 that revealed a BUN of 12 and creatinine is 0.8.  Sodium was 134 potassium 4.3.  Total bili 0.9, alkaline phosphatase is 171, AST 80, ALT 52..  WBC is 4.5, hemoglobin 14.4, platelet count of 72,000.

## 2023-03-24 NOTE — HPI-OTHER HISTORIES
MRI of abdomen on 12/23/2022 revealed a 7 mm cyst in the tail of the pancreas.  There is liver cirrhosis with no solid masses.  There is splenomegaly.  The portal vein is patent.  There is a right lung opacity noted.  MRI of the abdomen with and without contrast/MRCP 1/2022 revealed liver cirrhosis with portal hypertension and large volume ascites.  There is a 4 mm sidebranch intraductal papillary mucinous neoplasm in the tail of the pancreas.  On 1/26/2022 hepatitis a total antibody is positive, hepatitis B surface antigen negative, hepatitis B surface antibody negative, hepatitis B core antibody negative, hepatitis C antibody negative. Alpha-1 antitrypsin level is normal at 169.  Ferritin is 234.  Antinuclear antibody is negative, antimitochondrial antibody is negative, anti-smooth muscle antibody is negative. IgG level is elevated at 2092, IgA level is elevated at 533, IgM level is normal at 182.  Alpha-fetoprotein is 3.1.

## 2023-04-19 ENCOUNTER — ERX REFILL RESPONSE (OUTPATIENT)
Dept: URBAN - METROPOLITAN AREA CLINIC 113 | Facility: CLINIC | Age: 78
End: 2023-04-19

## 2023-04-19 RX ORDER — AMILORIDE HYDROCLORIDE 5 MG/1
TAKE 1 TABLET BY MOUTH EVERY DAY WITH FOOD THANK YOU TABLET ORAL
Qty: 30 TABLET | Refills: 1 | OUTPATIENT

## 2023-04-19 RX ORDER — AMILORIDE HYDROCHLORIDE 5 MG/1
TAKE 1 TABLET BY MOUTH EVERY DAY WITH FOOD THANK YOU TABLET ORAL
Qty: 30 TABLET | Refills: 1 | OUTPATIENT

## 2023-05-19 ENCOUNTER — TELEPHONE ENCOUNTER (OUTPATIENT)
Dept: URBAN - METROPOLITAN AREA CLINIC 113 | Facility: CLINIC | Age: 78
End: 2023-05-19

## 2023-05-19 RX ORDER — AMILORIDE HYDROCHLORIDE 5 MG/1
TAKE 1 TABLET BY MOUTH EVERY DAY WITH FOOD THANK YOU TABLET ORAL
Qty: 90 | Refills: 1

## 2023-06-06 ENCOUNTER — LAB OUTSIDE AN ENCOUNTER (OUTPATIENT)
Dept: URBAN - METROPOLITAN AREA CLINIC 113 | Facility: CLINIC | Age: 78
End: 2023-06-06

## 2023-06-23 ENCOUNTER — TELEPHONE ENCOUNTER (OUTPATIENT)
Dept: URBAN - METROPOLITAN AREA CLINIC 113 | Facility: CLINIC | Age: 78
End: 2023-06-23

## 2023-06-23 RX ORDER — FUROSEMIDE 40 MG/1
1 TABLET TABLET ORAL ONCE A DAY
Qty: 30 TABLET | Refills: 6
Start: 2022-01-18

## 2023-06-26 ENCOUNTER — TELEPHONE ENCOUNTER (OUTPATIENT)
Dept: URBAN - METROPOLITAN AREA CLINIC 113 | Facility: CLINIC | Age: 78
End: 2023-06-26

## 2023-06-26 ENCOUNTER — OFFICE VISIT (OUTPATIENT)
Dept: URBAN - METROPOLITAN AREA CLINIC 113 | Facility: CLINIC | Age: 78
End: 2023-06-26
Payer: COMMERCIAL

## 2023-06-26 ENCOUNTER — LAB OUTSIDE AN ENCOUNTER (OUTPATIENT)
Dept: URBAN - METROPOLITAN AREA CLINIC 113 | Facility: CLINIC | Age: 78
End: 2023-06-26

## 2023-06-26 VITALS
HEIGHT: 63 IN | BODY MASS INDEX: 27.29 KG/M2 | SYSTOLIC BLOOD PRESSURE: 118 MMHG | DIASTOLIC BLOOD PRESSURE: 55 MMHG | RESPIRATION RATE: 14 BRPM | HEART RATE: 50 BPM | TEMPERATURE: 97.3 F | WEIGHT: 154 LBS

## 2023-06-26 DIAGNOSIS — R18.8 OTHER ASCITES: ICD-10-CM

## 2023-06-26 DIAGNOSIS — K40.90 RIGHT INGUINAL HERNIA: ICD-10-CM

## 2023-06-26 DIAGNOSIS — K86.2 PANCREATIC CYST: ICD-10-CM

## 2023-06-26 DIAGNOSIS — K74.60 CIRRHOSIS: ICD-10-CM

## 2023-06-26 DIAGNOSIS — I85.00 ESOPHAGEAL VARICES: ICD-10-CM

## 2023-06-26 DIAGNOSIS — D69.6 THROMBOCYTOPENIA: ICD-10-CM

## 2023-06-26 DIAGNOSIS — K76.6 PORTAL HYPERTENSION: ICD-10-CM

## 2023-06-26 DIAGNOSIS — E83.10 CIRRHOSIS DUE TO HEMOCHROMATOSIS: ICD-10-CM

## 2023-06-26 DIAGNOSIS — R05.9 COUGH: ICD-10-CM

## 2023-06-26 PROCEDURE — 99214 OFFICE O/P EST MOD 30 MIN: CPT | Performed by: INTERNAL MEDICINE

## 2023-06-26 RX ORDER — METFORMIN HYDROCHLORIDE 500 MG/1
1 TABLET WITH A MEAL TABLET, FILM COATED ORAL ONCE A DAY
Status: ON HOLD | COMMUNITY

## 2023-06-26 RX ORDER — GLIMEPIRIDE 4 MG/1
1 TABLET WITH BREAKFAST OR THE FIRST MAIN MEAL OF THE DAY TABLET ORAL TWICE DAILY
Status: ACTIVE | COMMUNITY

## 2023-06-26 RX ORDER — SPIRONOLACTONE 100 MG/1
TAKE 1 TABLET BY MOUTH ONCE DAILY TABLET, FILM COATED ORAL
Qty: 90 TABLET | Refills: 0 | Status: ON HOLD | COMMUNITY

## 2023-06-26 RX ORDER — AMILORIDE HYDROCHLORIDE 5 MG/1
TAKE 1 TABLET BY MOUTH EVERY DAY WITH FOOD THANK YOU TABLET ORAL
Qty: 90 | Refills: 1 | Status: ACTIVE | COMMUNITY

## 2023-06-26 RX ORDER — INSULIN LISPRO 100 [IU]/ML
AS DIRECTED INJECTION, SOLUTION INTRAVENOUS; SUBCUTANEOUS
Status: ACTIVE | COMMUNITY

## 2023-06-26 RX ORDER — ZINC GLUCONATE 50 MG
1 TABLET TABLET ORAL ONCE A DAY
Status: ON HOLD | COMMUNITY

## 2023-06-26 RX ORDER — FUROSEMIDE 40 MG/1
1 TABLET TABLET ORAL ONCE A DAY
Qty: 30 TABLET | Refills: 6 | Status: ACTIVE | COMMUNITY
Start: 2022-01-18

## 2023-06-26 RX ORDER — OMEPRAZOLE 20 MG/1
1 CAPSULE 30 MINUTES BEFORE MORNING MEAL CAPSULE, DELAYED RELEASE ORAL ONCE A DAY
Status: ACTIVE | COMMUNITY

## 2023-06-26 RX ORDER — LISINOPRIL 10 MG/1
1 TABLET TABLET ORAL ONCE A DAY
Status: ON HOLD | COMMUNITY

## 2023-06-26 RX ORDER — METOPROLOL TARTRATE 25 MG/1
1 TABLET WITH FOOD TABLET, FILM COATED ORAL TWICE A DAY
Status: ACTIVE | COMMUNITY

## 2023-06-26 RX ORDER — MULTIVITAMIN
1 TABLET TABLET ORAL ONCE A DAY
COMMUNITY

## 2023-06-26 RX ORDER — ASCORBIC ACID 1000 MG
1 TABLET TABLET ORAL ONCE A DAY
Status: ACTIVE | COMMUNITY

## 2023-06-26 RX ORDER — TRAZODONE HYDROCHLORIDE 100 MG/1
1 TABLET AT BEDTIME TABLET, FILM COATED ORAL ONCE A DAY
Status: ACTIVE | COMMUNITY

## 2023-06-26 RX ORDER — CYCLOBENZAPRINE HYDROCHLORIDE 10 MG/1
1 TABLET AT BEDTIME AS NEEDED TABLET, FILM COATED ORAL ONCE A DAY
Status: ON HOLD | COMMUNITY

## 2023-06-26 RX ORDER — INSULIN DEGLUDEC INJECTION 100 U/ML
AS DIRECTED INJECTION, SOLUTION SUBCUTANEOUS
Status: ACTIVE | COMMUNITY

## 2023-06-26 NOTE — HPI-TODAY'S VISIT:
77-year-old male with a history of hypertension, diabetes, hemochromatosis and cirrhosis.  Presents for follow up appt.   He is accompanied by both his wife and daughter.  He was diagnosed with cirrhosis several years ago.  He reports that history of hemochromatosis, previously requiring phlebotomy.  This was managed by hematology, Dr. Warren.  There is no family history of GI malignancy.  Ultrasound-guided paracentesis 1/10/2022:4.4 L removed.  On 11/28/2022 alpha-fetoprotein is 4. Doing well from a GI point of view.  He still has dyspnea on exertion from his cryptococcal pneumonia.  He denies any heartburn, abdominal pain, dysphagia.  There is no nausea or vomiting.  He has a bowel movement or 2 per day and has been no blood or melena.  He did have blood work done on June 16 in [place].  I do not have that result. They bring blood work from 3/6/23 that revealed a BUN of 12 and creatinine is 0.8.  Sodium was 134 potassium 4.3.  Total bili 0.9, alkaline phosphatase is 171, AST 80, ALT 52..  WBC is 4.5, hemoglobin 14.4, platelet count of 72,000.

## 2023-07-05 ENCOUNTER — TELEPHONE ENCOUNTER (OUTPATIENT)
Dept: URBAN - METROPOLITAN AREA CLINIC 113 | Facility: CLINIC | Age: 78
End: 2023-07-05

## 2023-07-17 ENCOUNTER — TELEPHONE ENCOUNTER (OUTPATIENT)
Dept: URBAN - METROPOLITAN AREA CLINIC 113 | Facility: CLINIC | Age: 78
End: 2023-07-17

## 2023-10-26 ENCOUNTER — OFFICE VISIT (OUTPATIENT)
Dept: URBAN - METROPOLITAN AREA CLINIC 113 | Facility: CLINIC | Age: 78
End: 2023-10-26

## 2023-10-31 ENCOUNTER — OFFICE VISIT (OUTPATIENT)
Dept: URBAN - METROPOLITAN AREA CLINIC 113 | Facility: CLINIC | Age: 78
End: 2023-10-31

## 2023-11-28 ENCOUNTER — OFFICE VISIT (OUTPATIENT)
Dept: URBAN - METROPOLITAN AREA CLINIC 113 | Facility: CLINIC | Age: 78
End: 2023-11-28

## 2024-01-08 ENCOUNTER — ERX REFILL RESPONSE (OUTPATIENT)
Dept: URBAN - METROPOLITAN AREA CLINIC 113 | Facility: CLINIC | Age: 79
End: 2024-01-08

## 2024-01-08 RX ORDER — FUROSEMIDE 40 MG/1
1 TABLET TABLET ORAL ONCE A DAY
Qty: 30 TABLET | Refills: 6 | OUTPATIENT

## 2024-01-08 RX ORDER — FUROSEMIDE 40 MG/1
TAKE 1 TABLET BY MOUTH EVERY DAY THANK YOU TABLET ORAL
Qty: 30 TABLET | Refills: 6 | OUTPATIENT

## 2024-01-25 ENCOUNTER — OFFICE VISIT (OUTPATIENT)
Dept: URBAN - METROPOLITAN AREA CLINIC 113 | Facility: CLINIC | Age: 79
End: 2024-01-25
Payer: COMMERCIAL

## 2024-01-25 ENCOUNTER — DASHBOARD ENCOUNTERS (OUTPATIENT)
Age: 79
End: 2024-01-25

## 2024-01-25 VITALS
TEMPERATURE: 97.3 F | HEIGHT: 63 IN | RESPIRATION RATE: 14 BRPM | DIASTOLIC BLOOD PRESSURE: 55 MMHG | BODY MASS INDEX: 27.11 KG/M2 | HEART RATE: 55 BPM | WEIGHT: 153 LBS | SYSTOLIC BLOOD PRESSURE: 119 MMHG

## 2024-01-25 DIAGNOSIS — R18.8 OTHER ASCITES: ICD-10-CM

## 2024-01-25 DIAGNOSIS — I85.00 ESOPHAGEAL VARICES: ICD-10-CM

## 2024-01-25 DIAGNOSIS — K86.2 PANCREATIC CYST: ICD-10-CM

## 2024-01-25 DIAGNOSIS — E83.10 CIRRHOSIS DUE TO HEMOCHROMATOSIS: ICD-10-CM

## 2024-01-25 DIAGNOSIS — D69.6 THROMBOCYTOPENIA: ICD-10-CM

## 2024-01-25 DIAGNOSIS — K74.60 CIRRHOSIS: ICD-10-CM

## 2024-01-25 DIAGNOSIS — K76.6 PORTAL HYPERTENSION: ICD-10-CM

## 2024-01-25 DIAGNOSIS — K40.90 RIGHT INGUINAL HERNIA: ICD-10-CM

## 2024-01-25 PROCEDURE — 99214 OFFICE O/P EST MOD 30 MIN: CPT | Performed by: INTERNAL MEDICINE

## 2024-01-25 RX ORDER — SPIRONOLACTONE 100 MG/1
TAKE 1 TABLET BY MOUTH ONCE DAILY TABLET, FILM COATED ORAL
Qty: 90 TABLET | Refills: 0 | Status: ON HOLD | COMMUNITY

## 2024-01-25 RX ORDER — OMEPRAZOLE 20 MG/1
1 CAPSULE 30 MINUTES BEFORE MORNING MEAL CAPSULE, DELAYED RELEASE ORAL ONCE A DAY
Status: ACTIVE | COMMUNITY

## 2024-01-25 RX ORDER — INSULIN DEGLUDEC INJECTION 100 U/ML
AS DIRECTED INJECTION, SOLUTION SUBCUTANEOUS
Status: ACTIVE | COMMUNITY

## 2024-01-25 RX ORDER — METOPROLOL TARTRATE 25 MG/1
1 TABLET WITH FOOD TABLET, FILM COATED ORAL TWICE A DAY
Status: ACTIVE | COMMUNITY

## 2024-01-25 RX ORDER — ZINC GLUCONATE 50 MG
1 TABLET TABLET ORAL ONCE A DAY
Status: ON HOLD | COMMUNITY

## 2024-01-25 RX ORDER — METFORMIN HYDROCHLORIDE 500 MG/1
1 TABLET WITH A MEAL TABLET, FILM COATED ORAL ONCE A DAY
Status: ON HOLD | COMMUNITY

## 2024-01-25 RX ORDER — GLIMEPIRIDE 4 MG/1
1 TABLET WITH BREAKFAST OR THE FIRST MAIN MEAL OF THE DAY TABLET ORAL TWICE DAILY
Status: ON HOLD | COMMUNITY

## 2024-01-25 RX ORDER — FLUCONAZOLE 200 MG/1
1 TABLET TABLET ORAL
Status: ACTIVE | COMMUNITY

## 2024-01-25 RX ORDER — CYCLOBENZAPRINE HYDROCHLORIDE 10 MG/1
1 TABLET AT BEDTIME AS NEEDED TABLET, FILM COATED ORAL ONCE A DAY
Status: ON HOLD | COMMUNITY

## 2024-01-25 RX ORDER — TRAZODONE HYDROCHLORIDE 100 MG/1
1 TABLET AT BEDTIME TABLET, FILM COATED ORAL ONCE A DAY
Status: ACTIVE | COMMUNITY

## 2024-01-25 RX ORDER — FUROSEMIDE 40 MG/1
TAKE 1 TABLET BY MOUTH EVERY DAY THANK YOU TABLET ORAL
Qty: 30 TABLET | Refills: 6 | Status: ACTIVE | COMMUNITY

## 2024-01-25 RX ORDER — INSULIN LISPRO 100 [IU]/ML
AS DIRECTED INJECTION, SOLUTION INTRAVENOUS; SUBCUTANEOUS
Status: ACTIVE | COMMUNITY

## 2024-01-25 RX ORDER — LISINOPRIL 10 MG/1
1 TABLET TABLET ORAL ONCE A DAY
Status: ON HOLD | COMMUNITY

## 2024-01-25 RX ORDER — ASCORBIC ACID 1000 MG
1 TABLET TABLET ORAL ONCE A DAY
Status: ACTIVE | COMMUNITY

## 2024-01-25 RX ORDER — AMILORIDE HYDROCHLORIDE 5 MG/1
TAKE 1 TABLET BY MOUTH EVERY DAY WITH FOOD THANK YOU TABLET ORAL
Qty: 90 | Refills: 1 | Status: ACTIVE | COMMUNITY

## 2024-01-25 RX ORDER — MULTIVITAMIN
1 TABLET TABLET ORAL ONCE A DAY
COMMUNITY

## 2024-01-25 NOTE — HPI-OTHER HISTORIES
MRI of the abdomen with and without contrast on 8/4/2023 revealed a posterior right lower lobe irregular consolidation, a small hiatal hernia, the liver is shrunken and nodular with no suspicious liver lesions there is a patent portal vein.  There is a stable 7 mm T2 hyperintense cyst in the pancreatic tail without enhancement or PD dilation there are no solid masses.  There is splenomegaly of 15.6 cm there is trace ascites.  There is small area gastric and perisplenic varices.  MRI of abdomen on 12/23/2022 revealed a 7 mm cyst in the tail of the pancreas.  There is liver cirrhosis with no solid masses.  There is splenomegaly.  The portal vein is patent.  There is a right lung opacity noted.  MRI of the abdomen with and without contrast/MRCP 1/2022 revealed liver cirrhosis with portal hypertension and large volume ascites.  There is a 4 mm sidebranch intraductal papillary mucinous neoplasm in the tail of the pancreas.  On 1/26/2022 hepatitis a total antibody is positive, hepatitis B surface antigen negative, hepatitis B surface antibody negative, hepatitis B core antibody negative, hepatitis C antibody negative. Alpha-1 antitrypsin level is normal at 169.  Ferritin is 234.  Antinuclear antibody is negative, antimitochondrial antibody is negative, anti-smooth muscle antibody is negative. IgG level is elevated at 2092, IgA level is elevated at 533, IgM level is normal at 182.  Alpha-fetoprotein is 3.1.

## 2024-03-04 ENCOUNTER — LAB (OUTPATIENT)
Dept: URBAN - METROPOLITAN AREA CLINIC 113 | Facility: CLINIC | Age: 79
End: 2024-03-04

## 2024-03-11 ENCOUNTER — LAB (OUTPATIENT)
Dept: URBAN - METROPOLITAN AREA CLINIC 113 | Facility: CLINIC | Age: 79
End: 2024-03-11

## 2024-03-18 ENCOUNTER — LAB (OUTPATIENT)
Dept: URBAN - METROPOLITAN AREA CLINIC 113 | Facility: CLINIC | Age: 79
End: 2024-03-18

## 2024-07-12 ENCOUNTER — OFFICE VISIT (OUTPATIENT)
Dept: URBAN - METROPOLITAN AREA CLINIC 113 | Facility: CLINIC | Age: 79
End: 2024-07-12